# Patient Record
Sex: MALE | Race: WHITE | NOT HISPANIC OR LATINO
[De-identification: names, ages, dates, MRNs, and addresses within clinical notes are randomized per-mention and may not be internally consistent; named-entity substitution may affect disease eponyms.]

---

## 2019-07-16 ENCOUNTER — APPOINTMENT (OUTPATIENT)
Dept: PULMONOLOGY | Facility: CLINIC | Age: 54
End: 2019-07-16
Payer: COMMERCIAL

## 2019-07-16 VITALS
HEART RATE: 85 BPM | DIASTOLIC BLOOD PRESSURE: 84 MMHG | TEMPERATURE: 98.7 F | SYSTOLIC BLOOD PRESSURE: 120 MMHG | BODY MASS INDEX: 30.2 KG/M2 | HEIGHT: 72 IN | WEIGHT: 223 LBS | OXYGEN SATURATION: 99 %

## 2019-07-16 PROCEDURE — 99243 OFF/OP CNSLTJ NEW/EST LOW 30: CPT | Mod: 25

## 2019-07-16 PROCEDURE — 94060 EVALUATION OF WHEEZING: CPT

## 2019-07-16 PROCEDURE — 94727 GAS DIL/WSHOT DETER LNG VOL: CPT

## 2019-07-16 PROCEDURE — 94729 DIFFUSING CAPACITY: CPT

## 2019-07-16 RX ORDER — OMEPRAZOLE MAGNESIUM 20 MG/1
20 TABLET, DELAYED RELEASE ORAL DAILY
Qty: 30 | Refills: 1 | Status: ACTIVE | COMMUNITY
Start: 2019-07-16 | End: 1900-01-01

## 2019-08-13 ENCOUNTER — APPOINTMENT (OUTPATIENT)
Dept: PULMONOLOGY | Facility: CLINIC | Age: 54
End: 2019-08-13
Payer: COMMERCIAL

## 2019-08-13 VITALS
HEART RATE: 84 BPM | WEIGHT: 219 LBS | TEMPERATURE: 98.2 F | DIASTOLIC BLOOD PRESSURE: 78 MMHG | OXYGEN SATURATION: 95 % | SYSTOLIC BLOOD PRESSURE: 110 MMHG | RESPIRATION RATE: 16 BRPM | BODY MASS INDEX: 29.7 KG/M2

## 2019-08-13 PROCEDURE — 99214 OFFICE O/P EST MOD 30 MIN: CPT

## 2019-08-13 RX ORDER — FLUTICASONE FUROATE 100 UG/1
100 POWDER RESPIRATORY (INHALATION) DAILY
Qty: 1 | Refills: 4 | Status: DISCONTINUED | COMMUNITY
Start: 2019-07-16 | End: 2019-08-13

## 2019-08-13 NOTE — HISTORY OF PRESENT ILLNESS
[FreeTextEntry1] : 54 year old man who presented due to ongoing dyspnea. He reports that he he has had breathing problems since age 13. However, he was diagnosed with asthma 4 years ago.  He reports allergies to pollen, severe allergy to cats.  He has a dog that he states does no affect him.  He uses albuterol without much benefit.  He states benefit from Benadryl though it makes him very sleepy. \par \par At the time of the last visit, He was started on an inhaled .steroid.  However, he stays this has not relieved his symptoms.  He also emphasizes that he has lower chest, and epigastric chest pain.  He also emphasizes that he had significant scoliosis that he is a heavy snorer.  He states that he underwent in sleep study in the past that was negative.  However it appears that this study was limited.\par \par \par He reports significant GERD symptoms.  He does not take any medication for this.  He does have a drink of liquor every night.  \par \par He also had been given prednisone several months ago but states it did not really help.  He feels need to take deep breath and yawn. \par  \par \par He also smokes a few cigarettes every day.  \par \par PSH\par \par hair transplant\par \par \par PMH:\par \par Menieres disease\par \par MEDS\par \par triamterene\par \par Propecia\par \par \par \par SH:\par \par tobacco:  1 pack every 2 weeks

## 2019-08-13 NOTE — REVIEW OF SYSTEMS
[Postnasal Drip] : postnasal drip [Sinus Problems] : sinus problems [Cough] : cough [Dyspnea] : dyspnea [Chest Tightness] : chest tightness [Hay Fever] : hay fever [Fever] : no fever [Poor Appetite] : normal appetite  [Sputum] : not coughing up ~M sputum [Pleuritic Pain] : no pleuritic pain [Hemoptysis] : no hemoptysis [Hypertension] : no ~T hypertension

## 2019-08-13 NOTE — REVIEW OF SYSTEMS
[Fever] : no fever [Poor Appetite] : normal appetite  [Postnasal Drip] : postnasal drip [Sinus Problems] : sinus problems [Cough] : cough [Sputum] : not coughing up ~M sputum [Hemoptysis] : no hemoptysis [Dyspnea] : dyspnea [Chest Tightness] : chest tightness [Pleuritic Pain] : no pleuritic pain [Hypertension] : no ~T hypertension [Hay Fever] : hay fever

## 2019-08-13 NOTE — PHYSICAL EXAM
[Normal Oropharynx] : abnormal oropharynx [Low Lying Soft Palate] : no low lying soft palate [Elongated Uvula] : no elongated uvula [FreeTextEntry1] : no edema

## 2019-08-13 NOTE — PHYSICAL EXAM
[Normal Oropharynx] : abnormal oropharynx [Low Lying Soft Palate] : no low lying soft palate [Elongated Uvula] : no elongated uvula [FreeTextEntry1] : no edema [General Appearance - Well Developed] : well developed [Normal Appearance] : normal appearance [No Deformities] : no deformities [General Appearance - Well Nourished] : well nourished [Enlarged Base of the Tongue] : enlargement of the base of the tongue [Neck Appearance] : the appearance of the neck was normal [Neck Cervical Mass (___cm)] : no neck mass was observed [Jugular Venous Distention Increased] : there was no jugular-venous distention [Heart Rate And Rhythm] : heart rate and rhythm were normal [Heart Sounds] : normal S1 and S2 [Arterial Pulses Normal] : the arterial pulses were normal [Murmurs] : no murmurs present [Respiration, Rhythm And Depth] : normal respiratory rhythm and effort [Exaggerated Use Of Accessory Muscles For Inspiration] : no accessory muscle use [Bowel Sounds] : normal bowel sounds [Abdomen Soft] : soft [Abdomen Tenderness] : non-tender [] : no hepato-splenomegaly [Nail Clubbing] : no clubbing of the fingernails [Affect] : the affect was normal [Mood] : the mood was normal

## 2019-08-13 NOTE — HISTORY OF PRESENT ILLNESS
[FreeTextEntry1] : 54 year old man who presents due to ongoing dyspnea. he reports that he he has had breathing problems since age 13. However, he was diagnosed with asthma 4 years ago.  He reports allergies to pollen, severe allergy to cats.  He has a dog that he states does no affect him.  He uses albuterol without much benefit.  He states benefit from Benadryl though it makes him very sleepy. \par \par He was given prednisone several months ago but states it did not really help.  He feels need to take deep breath and yawn. \par He has cough,with occasional sputum. He has constant postnasal drip and snoring.  He ha ssed Flonase with some relief.  \par \par he reports significant GERD symptoms.  He does not take any medication for this.  He does have a drink of liquor every night.   \par \par He had sleep study reportedly negative though he feels he did not sleep.\par .He also smokes a few cigarettes every day.  \par \par PSH\par \par hair transplant\par \par \par PMH:\par \par Menieres disease\par \par MEDS\par \par triamterene\par \par propecia\par \par \par \par SH:\par \par tobacco:  1 pack every 2 weeks

## 2019-08-13 NOTE — DISCUSSION/SUMMARY
[FreeTextEntry1] : Dyspnea cough with comorbidity\par \par GERD\par "asthma"\par \par seasonal allergy\par \par postnasal drip\par \par tobacco use\par \par PLAN\par \par add ICS, Arnuity once daily\par \par Albuterol MDI as needed\par \par restart on PPI or Pepcid over the counter\par \par start on Flonase nasal spray at night, with saline nasal wash\par \par Allegra 180 mg\par \par allergen avoidance\par

## 2019-08-13 NOTE — DISCUSSION/SUMMARY
[FreeTextEntry1] : Shortness of breath, Cough, epigastric / chest discomfort;  He states he has undergone stress test that was negative recently.\par Scoliosis, Gastric reflux\par Shortness of breath not relieved with inhaled steroid\par \par PLAN\par stop Asmanex\par \par start Breo 100/25 once daily as trial\par \par rule out hiatus hernia, costochondritis\par \par Obtain sleep study\par \par [facility]

## 2019-09-25 ENCOUNTER — APPOINTMENT (OUTPATIENT)
Dept: PULMONOLOGY | Facility: CLINIC | Age: 54
End: 2019-09-25

## 2020-12-09 ENCOUNTER — APPOINTMENT (OUTPATIENT)
Dept: PULMONOLOGY | Facility: CLINIC | Age: 55
End: 2020-12-09
Payer: COMMERCIAL

## 2020-12-09 VITALS
DIASTOLIC BLOOD PRESSURE: 90 MMHG | TEMPERATURE: 98 F | SYSTOLIC BLOOD PRESSURE: 120 MMHG | OXYGEN SATURATION: 98 % | RESPIRATION RATE: 16 BRPM | HEART RATE: 99 BPM

## 2020-12-09 DIAGNOSIS — R06.83 SNORING: ICD-10-CM

## 2020-12-09 DIAGNOSIS — Z23 ENCOUNTER FOR IMMUNIZATION: ICD-10-CM

## 2020-12-09 PROCEDURE — 99214 OFFICE O/P EST MOD 30 MIN: CPT

## 2020-12-09 PROCEDURE — 99072 ADDL SUPL MATRL&STAF TM PHE: CPT

## 2020-12-09 RX ORDER — MOMETASONE FUROATE AND FORMOTEROL FUMARATE DIHYDRATE 100; 5 UG/1; UG/1
100-5 AEROSOL RESPIRATORY (INHALATION)
Qty: 1 | Refills: 2 | Status: DISCONTINUED | COMMUNITY
Start: 2019-08-13 | End: 2020-12-09

## 2020-12-10 PROBLEM — R06.83 SNORING: Status: ACTIVE | Noted: 2019-08-13

## 2020-12-10 PROBLEM — Z23 ENCOUNTER FOR IMMUNIZATION: Status: ACTIVE | Noted: 2020-12-10

## 2020-12-10 NOTE — DISCUSSION/SUMMARY
[FreeTextEntry1] : history pollen allergy\par dyspnea\par \par smoker\par \par obstructive arwys disease\par \par recent fall with T12 12 fracture\par \par PLAN\par \par trial of ICS/LABA\par \par albuterol  MDI as needed\par \par montelukast 10 mg \par \par declines influenza vaccine\par \par discuss NAYELI next visit\par \par Geovany Johnson MD [facility] \par \par

## 2020-12-10 NOTE — HISTORY OF PRESENT ILLNESS
[Obstructive Sleep Apnea] : obstructive sleep apnea [Snoring] : snoring [TextBox_4] : \par 55 year old man who presented due to ongoing dyspnea. He reports that he he has had breathing problems since age 13. However, he was diagnosed with asthma 6 years ago. He reports allergies to pollen, severe allergy to cats. He has a dog that he states does no affect him. He uses albuterol without much benefit. He states benefit from Benadryl though it makes him very sleepy. \par \par At the time of the last visit, He was started on an inhaled ICS/LABA but stopped using it.\par \par He also has significant scoliosis.\par \par He now reports that he was diagnose with NAYELI in the past but has not tolerated treatment with CPAP.\par \par He is a heavy snorer. \par \par He reports significant GERD symptoms. He does not take any medication for this. He does have a drink of liquor every night. \par \par \par He also smokes a few cigarettes every day. \par \par He had negative COVID test 2 weeks ago.\par \par \par He fell off ladder October 2020. Kyphoplasty considered for T 11-12\par \par He has had dyspnea, allergies worse.\par \par He also has GERD.  \par He is not using inhalers. \par \par He is not anything for allergies\par \par \par \par PSH\par \par hair transplant\par \par \par PMH:\par \par Menieres disease\par \par MEDS\par \par triamterene\par \par Propecia\par \par \par \par SH:\par \par tobacco: 1 pack every 2 weeks \par \par  \par  [TextBox_19] : tested 6 years ago approximately

## 2020-12-10 NOTE — REVIEW OF SYSTEMS
[Postnasal Drip] : postnasal drip [Dyspnea] : dyspnea [Hay Fever] : hay fever [Fever] : no fever [Nasal Congestion] : no nasal congestion [Cough] : no cough [Sputum] : no sputum

## 2020-12-10 NOTE — PHYSICAL EXAM
[No Acute Distress] : no acute distress [Normal Oropharynx] : normal oropharynx [1+] : Right Tonsil: 1+ [Normal Appearance] : normal appearance [No Neck Mass] : no neck mass [Normal Rate/Rhythm] : normal rate/rhythm [Normal S1, S2] : normal s1, s2 [No Murmurs] : no murmurs [No Resp Distress] : no resp distress [Benign] : benign [No Masses] : no masses [Normal Bowel Sounds] : normal bowel sounds [No Clubbing] : no clubbing [No Focal Deficits] : no focal deficits [No Motor Deficits] : no motor deficits [Oriented x3] : oriented x3 [Normal Affect] : normal affect [TextBox_68] : mildly diminished otherwise clear

## 2020-12-18 ENCOUNTER — TRANSCRIPTION ENCOUNTER (OUTPATIENT)
Age: 55
End: 2020-12-18

## 2020-12-18 ENCOUNTER — NON-APPOINTMENT (OUTPATIENT)
Age: 55
End: 2020-12-18

## 2020-12-23 ENCOUNTER — APPOINTMENT (OUTPATIENT)
Dept: PULMONOLOGY | Facility: CLINIC | Age: 55
End: 2020-12-23
Payer: COMMERCIAL

## 2020-12-23 ENCOUNTER — NON-APPOINTMENT (OUTPATIENT)
Age: 55
End: 2020-12-23

## 2020-12-23 VITALS
TEMPERATURE: 99 F | OXYGEN SATURATION: 96 % | HEART RATE: 95 BPM | DIASTOLIC BLOOD PRESSURE: 80 MMHG | SYSTOLIC BLOOD PRESSURE: 140 MMHG | RESPIRATION RATE: 17 BRPM

## 2020-12-23 PROCEDURE — 93000 ELECTROCARDIOGRAM COMPLETE: CPT

## 2020-12-23 PROCEDURE — 99072 ADDL SUPL MATRL&STAF TM PHE: CPT

## 2020-12-23 PROCEDURE — 99214 OFFICE O/P EST MOD 30 MIN: CPT | Mod: 25

## 2020-12-23 PROCEDURE — 94060 EVALUATION OF WHEEZING: CPT

## 2020-12-23 PROCEDURE — 94727 GAS DIL/WSHOT DETER LNG VOL: CPT

## 2020-12-23 PROCEDURE — 94729 DIFFUSING CAPACITY: CPT

## 2020-12-27 NOTE — PROCEDURE
[FreeTextEntry1] : PFT\par \par normal spirometry, lung volumes and diffusion\par \par \par  [facility]

## 2020-12-27 NOTE — PHYSICAL EXAM
[No Acute Distress] : no acute distress [Normal Oropharynx] : normal oropharynx [1+] : Right Tonsil: 1+ [Normal Appearance] : normal appearance [No Neck Mass] : no neck mass [Normal Rate/Rhythm] : normal rate/rhythm [Normal S1, S2] : normal s1, s2 [No Murmurs] : no murmurs [No Resp Distress] : no resp distress [Clear to Auscultation Bilaterally] : clear to auscultation bilaterally [Benign] : benign [No Masses] : no masses [Normal Bowel Sounds] : normal bowel sounds [No Clubbing] : no clubbing [No Focal Deficits] : no focal deficits [No Motor Deficits] : no motor deficits [Oriented x3] : oriented x3 [Normal Affect] : normal affect [TextBox_68] : no wheeze, rhonchi, rales, lungs clear full aeration

## 2020-12-27 NOTE — DISCUSSION/SUMMARY
[FreeTextEntry1] : history pollen allergy\par dyspnea\par hx asthma\par \par increased dyspnea, with clear lungs, no obstruction on PFT\par \par ddimer normal\par \par PLAN\par continue ICS/LABA, Breo\par \par albuterol MDI as needed\par \par montelukast 10 mg \par \par complete steroid course\par \par declines influenza vaccine\par \par discuss NAYELI next visit\par \par reevaluate next visit \par \par he will call me if symptoms persist\par \par he is also advised to go to ER if symptoms worsen\par \par Geovany Johnson MD [facility] \par

## 2020-12-27 NOTE — HISTORY OF PRESENT ILLNESS
[Obstructive Sleep Apnea] : obstructive sleep apnea [Snoring] : snoring [TextBox_4] : \par 55 year old man who presented due to ongoing dyspnea. He reports that he he has had breathing problems since age 13. However, he was diagnosed with asthma 6 years ago. He reports allergies to pollen, severe allergy to cats. He has a dog that he states does no affect him. He uses albuterol without much benefit. He states benefit from Benadryl though it makes him very sleepy. \par \par At the time of the last visit, He has been using inhaled ICS/LABA ( Breo)  and montelukast. \par \par He also has significant scoliosis.\par \par He has a history of NAYELI in the past but has not tolerated treatment with CPAP.\par \par He is a heavy snorer. \par \par He reports significant GERD symptoms. He does not take any medication for this. He does have a drink of liquor every night. \par \par \par He also smokes a few cigarettes every day. \par \par He had negative COVID test 2 weeks ago.\par \par \par He fell off ladder October 2020. Kyphoplasty considered for T 11-12\par \par He has had dyspnea, allergies worse.\par \par He also has GERD.  \par \par he returns because he has been having increased dyspnea.\par \par he was given oral prednisone.  he has only slightly improved.\par \par he had COVID testing recently\par \par \par PSH\par \par hair transplant\par \par \par PMH:\par \par Menieres disease\par \par MEDS\par \par triamterene\par \par Propecia\par \par \par \par SH:\par \par tobacco: 1 pack every 2 weeks \par \par \par \par \par \par \par  \par  [TextBox_19] : tested 6 years ago approximately

## 2021-01-03 LAB
BASOPHILS # BLD AUTO: 0.08 K/UL
BASOPHILS NFR BLD AUTO: 1.1 %
CRP SERPL-MCNC: <0.1 MG/DL
DEPRECATED D DIMER PPP IA-ACNC: <150 NG/ML DDU
EOSINOPHIL # BLD AUTO: 0.08 K/UL
EOSINOPHIL NFR BLD AUTO: 1.1 %
HCT VFR BLD CALC: 48.2 %
HGB BLD-MCNC: 16.8 G/DL
IMM GRANULOCYTES NFR BLD AUTO: 0.3 %
LYMPHOCYTES # BLD AUTO: 2.49 K/UL
LYMPHOCYTES NFR BLD AUTO: 34.1 %
MAN DIFF?: NORMAL
MCHC RBC-ENTMCNC: 30.5 PG
MCHC RBC-ENTMCNC: 34.9 GM/DL
MCV RBC AUTO: 87.5 FL
MONOCYTES # BLD AUTO: 0.53 K/UL
MONOCYTES NFR BLD AUTO: 7.3 %
NEUTROPHILS # BLD AUTO: 4.11 K/UL
NEUTROPHILS NFR BLD AUTO: 56.1 %
NT-PROBNP SERPL-MCNC: <5 PG/ML
PLATELET # BLD AUTO: 257 K/UL
RBC # BLD: 5.51 M/UL
RBC # FLD: 12 %
SARS-COV-2 N GENE NPH QL NAA+PROBE: NOT DETECTED
WBC # FLD AUTO: 7.31 K/UL

## 2021-04-15 ENCOUNTER — RX RENEWAL (OUTPATIENT)
Age: 56
End: 2021-04-15

## 2021-07-06 ENCOUNTER — RX RENEWAL (OUTPATIENT)
Age: 56
End: 2021-07-06

## 2021-07-07 ENCOUNTER — RX RENEWAL (OUTPATIENT)
Age: 56
End: 2021-07-07

## 2021-11-24 ENCOUNTER — APPOINTMENT (OUTPATIENT)
Dept: PULMONOLOGY | Facility: CLINIC | Age: 56
End: 2021-11-24
Payer: COMMERCIAL

## 2021-11-24 VITALS
BODY MASS INDEX: 29.16 KG/M2 | OXYGEN SATURATION: 99 % | HEART RATE: 97 BPM | WEIGHT: 215 LBS | DIASTOLIC BLOOD PRESSURE: 92 MMHG | TEMPERATURE: 97.3 F | SYSTOLIC BLOOD PRESSURE: 134 MMHG

## 2021-11-24 PROCEDURE — 99214 OFFICE O/P EST MOD 30 MIN: CPT

## 2021-11-24 RX ORDER — METHYLPREDNISOLONE 4 MG/1
4 TABLET ORAL
Qty: 1 | Refills: 0 | Status: DISCONTINUED | COMMUNITY
Start: 2020-12-18 | End: 2021-11-24

## 2021-11-24 RX ORDER — FLUTICASONE PROPIONATE 50 UG/1
50 SPRAY, METERED NASAL
Qty: 1 | Refills: 1 | Status: DISCONTINUED | COMMUNITY
Start: 2019-07-16 | End: 2021-11-24

## 2021-11-27 NOTE — DISCUSSION/SUMMARY
[FreeTextEntry1] : history pollen allergy, not using medication\par dyspnea\par Asthma \par \par \par PLAN\par Restart ICS/LABA, Breo\par \par albuterol MDI as needed\par \par montelukast 10 mg \par \par \par declines influenza vaccine\par \par COVID vaccine \par \par discussed NAYELI , he declines further workup\par \par Total time spent : 30 minutes\par Including:\par Preparation prior to visit - Reviewing prior record, results of tests and Consultation Reports as applicable\par Conducting an appropriate H & P during today's encounter\par Appropriate orders for tests, medications and procedures, as applicable\par Counseling patient \par Note completion \par \par Geovany Johnson MD \par \par

## 2021-11-27 NOTE — HISTORY OF PRESENT ILLNESS
[Obstructive Sleep Apnea] : obstructive sleep apnea [Snoring] : snoring [TextBox_4] : \par 56 year old man who presented due to ongoing dyspnea. He reports that he he has had breathing problems since age 13. However, he was diagnosed with asthma 6 years ago. He reports allergies to pollen, severe allergy to cats. He has a dog that he states does no affect him. \par \par He states benefit from Benadryl though it makes him very sleepy. \par \par He also has significant scoliosis.\par \par He has a history of NAYELI in the past but has not tolerated treatment with CPAP.  He reports poor sleep.  \par \par He is a heavy snorer. \par \par He reports significant GERD symptoms. He does not take any medication for this. He does have a drink of liquor every night. \par \par \par He fell off ladder October 2020. Kyphoplasty considered for T 11-12\par \par \par He also has GERD.  \par \par .....\par \par He stopped using inhaled bronchodilator several months ago.  He now has increased symptoms.  He has cough and wheeze, worse at night.\par \par He is no longer smoking\par \par PSH\par \par hair transplant\par \par \par PMH:\par \par Menieres disease\par \par MEDS\par \par triamterene\par \par Propecia\par \par SH:\par \par tobacco: 1 pack every 2 weeks \par \par \par \par \par \par \par  \par  [TextBox_19] : tested 6 years ago approximately

## 2022-06-13 ENCOUNTER — RX RENEWAL (OUTPATIENT)
Age: 57
End: 2022-06-13

## 2022-10-09 ENCOUNTER — RX RENEWAL (OUTPATIENT)
Age: 57
End: 2022-10-09

## 2022-10-09 RX ORDER — MONTELUKAST 10 MG/1
10 TABLET, FILM COATED ORAL
Qty: 90 | Refills: 0 | Status: DISCONTINUED | COMMUNITY
Start: 2020-12-09 | End: 2022-10-09

## 2024-02-20 ENCOUNTER — APPOINTMENT (OUTPATIENT)
Dept: PULMONOLOGY | Facility: CLINIC | Age: 59
End: 2024-02-20
Payer: COMMERCIAL

## 2024-02-20 VITALS
HEART RATE: 89 BPM | DIASTOLIC BLOOD PRESSURE: 84 MMHG | BODY MASS INDEX: 27.8 KG/M2 | OXYGEN SATURATION: 94 % | TEMPERATURE: 98.2 F | SYSTOLIC BLOOD PRESSURE: 126 MMHG | WEIGHT: 205 LBS

## 2024-02-20 DIAGNOSIS — M40.209 UNSPECIFIED KYPHOSIS, SITE UNSPECIFIED: ICD-10-CM

## 2024-02-20 PROCEDURE — 99214 OFFICE O/P EST MOD 30 MIN: CPT | Mod: 25

## 2024-02-20 PROCEDURE — 94729 DIFFUSING CAPACITY: CPT

## 2024-02-20 PROCEDURE — 94727 GAS DIL/WSHOT DETER LNG VOL: CPT

## 2024-02-20 PROCEDURE — 94060 EVALUATION OF WHEEZING: CPT

## 2024-02-20 NOTE — HISTORY OF PRESENT ILLNESS
[Obstructive Sleep Apnea] : obstructive sleep apnea [Snoring] : snoring [Former] : former [< 20 pack-years] : < 20 pack-years [Never] : never [TextBox_4] : 58 year old man who presented due to ongoing dyspnea. He reports that he has had breathing problems since age 13. However, he was diagnosed with asthma 6 years ago. He reports allergies to pollen, severe allergy to cats. He has a dog that he states does no affect him.   He states benefit from Benadryl though it makes him very sleepy.   He also has significant scoliosis.  He has a history of NAYELI in the past but has not tolerated treatment with CPAP.  He reports poor sleep.    He is a heavy snorer.   He reports significant GERD symptoms. He does not take any medication for this. He does have a drink of liquor every night.    He fell off ladder October 2020. Kyphoplasty considered for T 11-12   He also has GERD.    .....  He stopped using inhaled bronchodilator several months ago.  He now has increased symptoms.  He has cough and wheeze, worse at night.  He is no longer smoking  PSH  hair transplant   PMH:  Menieres disease  MEDS  triamterene  Propecia  SH:  tobacco: 1 pack every 2 weeks           [TextBox_19] : tested 6 years ago approximately

## 2024-02-20 NOTE — PROCEDURE
[FreeTextEntry1] : PFT  normal spirometry, lung volumes and diffusion    [facility]

## 2024-02-20 NOTE — DISCUSSION/SUMMARY
[FreeTextEntry1] : history pollen allergy, not using medication dyspnea Asthma   He has not been using  ICS/LABA,albuterol MDI  or montelukast 10 mg for 1 year  He has had increased symptoms he states for 1 week  Also has lump on chest, possible cyst . Has appointment with dermatology  PFT is without obstructive airway disease normal   he has cough and dyspnea  Not smoking  declines influenza vaccine  PLAN  restart ICS LABA, albuterol and montelukast 10 mg daily  Flonase and saline nasal spray  CXR  discussed NAYELI , he declines further workup  follow up  Total time spent : 30 minutes Including: Preparation prior to visit - Reviewing prior record, results of tests and Consultation Reports as applicable Conducting an appropriate H & P during today's encounter Appropriate orders for tests, medications and procedures, as applicable Counseling patient  Note completion   Geovany Johnson MD

## 2024-03-12 ENCOUNTER — APPOINTMENT (OUTPATIENT)
Dept: PULMONOLOGY | Facility: CLINIC | Age: 59
End: 2024-03-12
Payer: COMMERCIAL

## 2024-03-12 VITALS
HEART RATE: 92 BPM | WEIGHT: 200 LBS | BODY MASS INDEX: 27.13 KG/M2 | OXYGEN SATURATION: 97 % | DIASTOLIC BLOOD PRESSURE: 80 MMHG | SYSTOLIC BLOOD PRESSURE: 118 MMHG | TEMPERATURE: 98.2 F

## 2024-03-12 DIAGNOSIS — K21.9 GASTRO-ESOPHAGEAL REFLUX DISEASE W/OUT ESOPHAGITIS: ICD-10-CM

## 2024-03-12 DIAGNOSIS — R06.00 DYSPNEA, UNSPECIFIED: ICD-10-CM

## 2024-03-12 DIAGNOSIS — J45.909 UNSPECIFIED ASTHMA, UNCOMPLICATED: ICD-10-CM

## 2024-03-12 DIAGNOSIS — G47.33 OBSTRUCTIVE SLEEP APNEA (ADULT) (PEDIATRIC): ICD-10-CM

## 2024-03-12 PROCEDURE — 99214 OFFICE O/P EST MOD 30 MIN: CPT

## 2024-03-12 RX ORDER — FLUTICASONE FUROATE AND VILANTEROL TRIFENATATE 100; 25 UG/1; UG/1
100-25 POWDER RESPIRATORY (INHALATION)
Qty: 1 | Refills: 3 | Status: ACTIVE | COMMUNITY
Start: 2020-12-09 | End: 1900-01-01

## 2024-03-12 RX ORDER — MONTELUKAST 10 MG/1
10 TABLET, FILM COATED ORAL
Qty: 1 | Refills: 1 | Status: ACTIVE | COMMUNITY
Start: 2024-02-20 | End: 1900-01-01

## 2024-03-12 RX ORDER — ALBUTEROL SULFATE 90 UG/1
108 (90 BASE) INHALANT RESPIRATORY (INHALATION)
Qty: 1 | Refills: 3 | Status: ACTIVE | COMMUNITY
Start: 2020-12-09 | End: 1900-01-01

## 2024-03-12 NOTE — PHYSICAL EXAM
[No Acute Distress] : no acute distress [Normal Oropharynx] : normal oropharynx [1+] : Left Tonsil: 1+ [Normal Appearance] : normal appearance [No Neck Mass] : no neck mass [Normal Rate/Rhythm] : normal rate/rhythm [Normal S1, S2] : normal s1, s2 [No Murmurs] : no murmurs [Clear to Auscultation Bilaterally] : clear to auscultation bilaterally [No Resp Distress] : no resp distress [Benign] : benign [No Masses] : no masses [Normal Bowel Sounds] : normal bowel sounds [No Clubbing] : no clubbing [No Focal Deficits] : no focal deficits [No Motor Deficits] : no motor deficits [Oriented x3] : oriented x3 [Normal Affect] : normal affect [TextBox_68] : no wheeze, rhonchi, rales, lungs clear full aeration

## 2024-03-12 NOTE — PROCEDURE
[FreeTextEntry1] : PFT  normal spirometry, lung volumes and diffusion    [facility]

## 2024-03-12 NOTE — HISTORY OF PRESENT ILLNESS
[Former] : former [< 20 pack-years] : < 20 pack-years [Never] : never [Obstructive Sleep Apnea] : obstructive sleep apnea [Snoring] : snoring [TextBox_19] : tested 6 years ago approximately [TextBox_4] : 58 year old man who presented due to ongoing dyspnea. He reports that he has had breathing problems since age 13. However, he was diagnosed with asthma 6 years ago. He reports allergies to pollen, severe allergy to cats. He has a dog that he states does no affect him.   He states benefit from Benadryl though it makes him very sleepy.   He also has significant scoliosis.  He has a history of NAYELI in the past but has not tolerated treatment with CPAP.  He reports poor sleep.    He is a heavy snorer.   He reports significant GERD symptoms. He does not take any medication for this. He does have a drink of liquor every night.    He fell off ladder October 2020. Kyphoplasty considered for T 11-12   He also has GERD.    .....  He stopped using inhaled bronchodilator several months ago.  He now has increased symptoms.  He has cough and wheeze, worse at night.  He is no longer smoking  PSH  hair transplant   PMH:  Menieres disease  MEDS  triamterene  Propecia  SH:  tobacco: 1 pack every 2 weeks

## 2024-03-12 NOTE — DISCUSSION/SUMMARY
[FreeTextEntry1] : history pollen allergy, not using medication dyspnea Asthma   He has not been using  ICS/LABA,albuterol MDI  or montelukast 10 mg for 1 year  He has had increased symptoms he states for 1 week  Also has lump on chest, possible cyst . Has appointment with dermatology  PFT is without obstructive airway disease normal   he has cough and dyspnea  Not smoking  declines influenza vaccine  Has been taking ICS LABA, albuterol and montelukast 10 mg daily He is unclear on how to use them and not clear that he  has been using regularly  he has dyspnea at night.  Some dyspnea in day  He is having construction done at home  Flonase and saline nasal spray  used in morning  discussed NAYELI , he declines further workup  He has no wheeze on exam  PLAN Continue ICS LABA with albuterol, encourage use  Symptoms may relate to kyphosis  I have also recommended home sleep study  Will order at home  follow up  Total time spent : 30 minutes Including: Preparation prior to visit - Reviewing prior record, results of tests and Consultation Reports as applicable Conducting an appropriate H & P during today's encounter Appropriate orders for tests, medications and procedures, as applicable Counseling patient  Note completion   Geovany Johnson MD

## 2024-04-15 ENCOUNTER — LABORATORY RESULT (OUTPATIENT)
Age: 59
End: 2024-04-15

## 2024-04-16 LAB
BASOPHILS # BLD AUTO: 0.06 K/UL
BASOPHILS NFR BLD AUTO: 1.2 %
EOSINOPHIL # BLD AUTO: 0.06 K/UL
EOSINOPHIL # BLD MANUAL: 60 /UL
EOSINOPHIL NFR BLD AUTO: 1.2 %
HCT VFR BLD CALC: 41.6 %
HGB BLD-MCNC: 14.6 G/DL
LYMPHOCYTES # BLD AUTO: 1.47 K/UL
LYMPHOCYTES NFR BLD AUTO: 28.7 %
MCHC RBC-ENTMCNC: 30.6 PG
MCHC RBC-ENTMCNC: 35.1 GM/DL
MCV RBC AUTO: 87.2 FL
MONOCYTES # BLD AUTO: 0.47 K/UL
MONOCYTES NFR BLD AUTO: 9.2 %
MSMEAR: NORMAL
MYELOCYTES NFR BLD: 2.3 %
NEUTROPHILS # BLD AUTO: 2.76 K/UL
NEUTROPHILS NFR BLD AUTO: 52.9 %
NEUTS BAND NFR BLD MANUAL: 1.1 %
PLAT MORPH BLD: NORMAL
PLATELET # BLD AUTO: 242 K/UL
RBC # BLD: 4.77 M/UL
RBC # FLD: 12.5 %
RBC BLD AUTO: NORMAL
VARIANT LYMPHS # BLD MANUAL: 3.4 %
WBC # FLD AUTO: 5.11 K/UL

## 2024-04-17 LAB
A ALTERNATA IGE QN: <0.1 KUA/L
A FUMIGATUS IGE QN: <0.1 KUA/L
C ALBICANS IGE QN: <0.1 KUA/L
C HERBARUM IGE QN: <0.1 KUA/L
CAT DANDER IGE QN: <0.1 KUA/L
COMMON RAGWEED IGE QN: <0.1 KUA/L
D FARINAE IGE QN: 0.16 KUA/L
D PTERONYSS IGE QN: 0.15 KUA/L
DEPRECATED A ALTERNATA IGE RAST QL: 0 (ref 0–?)
DEPRECATED A FUMIGATUS IGE RAST QL: 0 (ref 0–?)
DEPRECATED C ALBICANS IGE RAST QL: 0
DEPRECATED C HERBARUM IGE RAST QL: 0 (ref 0–?)
DEPRECATED CAT DANDER IGE RAST QL: 0 (ref 0–?)
DEPRECATED COMMON RAGWEED IGE RAST QL: 0 (ref 0–?)
DEPRECATED D FARINAE IGE RAST QL: NORMAL (ref 0–?)
DEPRECATED D PTERONYSS IGE RAST QL: NORMAL (ref 0–?)
DEPRECATED DOG DANDER IGE RAST QL: 0 (ref 0–?)
DEPRECATED M RACEMOSUS IGE RAST QL: 0
DEPRECATED ROACH IGE RAST QL: 0 (ref 0–?)
DEPRECATED TIMOTHY IGE RAST QL: NORMAL (ref 0–?)
DEPRECATED WHITE OAK IGE RAST QL: 0 (ref 0–?)
DOG DANDER IGE QN: <0.1 KUA/L
M RACEMOSUS IGE QN: <0.1 KUA/L
ROACH IGE QN: <0.1 KUA/L
TIMOTHY IGE QN: 0.23 KUA/L
TOTAL IGE SMQN RAST: 4 KU/L
WHITE OAK IGE QN: <0.1 KUA/L

## 2024-04-18 LAB
ALTERN TENCAPG(M6): 3.1 MCG/ML
ASPER FUMCAPG(M3): 21.2 MCG/ML
AUREOBASCAPG(M12): 4.9 MCG/ML
LACEYELLA SACCHARI IGG: 7.1 MCG/ML
MICROPOLYCAPG(M22): <2 MCG/ML
PENIC CHRYCAPG(M1): 17 MCG/ML
PHOMA BETAE IGG: 3 MCG/ML
TRICHODERMA VIRIDE IGG: 3.4 MCG/ML

## 2024-09-17 ENCOUNTER — APPOINTMENT (OUTPATIENT)
Dept: PULMONOLOGY | Facility: CLINIC | Age: 59
End: 2024-09-17
Payer: COMMERCIAL

## 2024-09-17 VITALS
TEMPERATURE: 98.7 F | DIASTOLIC BLOOD PRESSURE: 80 MMHG | BODY MASS INDEX: 27.67 KG/M2 | WEIGHT: 204 LBS | OXYGEN SATURATION: 98 % | HEART RATE: 85 BPM | SYSTOLIC BLOOD PRESSURE: 130 MMHG

## 2024-09-17 DIAGNOSIS — J45.909 UNSPECIFIED ASTHMA, UNCOMPLICATED: ICD-10-CM

## 2024-09-17 DIAGNOSIS — G47.33 OBSTRUCTIVE SLEEP APNEA (ADULT) (PEDIATRIC): ICD-10-CM

## 2024-09-17 DIAGNOSIS — M40.209 UNSPECIFIED KYPHOSIS, SITE UNSPECIFIED: ICD-10-CM

## 2024-09-17 PROCEDURE — 99214 OFFICE O/P EST MOD 30 MIN: CPT

## 2024-09-17 RX ORDER — ALBUTEROL SULFATE AND BUDESONIDE 90; 80 UG/1; UG/1
90-80 AEROSOL, METERED RESPIRATORY (INHALATION) EVERY 6 HOURS
Qty: 1 | Refills: 3 | Status: ACTIVE | COMMUNITY
Start: 2024-09-17 | End: 1900-01-01

## 2024-09-17 NOTE — HISTORY OF PRESENT ILLNESS
[Former] : former [< 20 pack-years] : < 20 pack-years [Never] : never [Obstructive Sleep Apnea] : obstructive sleep apnea [Snoring] : snoring [TextBox_4] : 58 year old man who presented due to ongoing dyspnea. He reports that he has had breathing problems since age 13. However, he was diagnosed with asthma 6 years ago. He reports allergies to pollen, severe allergy to cats. He has a dog that he states does no affect him.   He states benefit from Benadryl though it makes him very sleepy.   He also has significant scoliosis.  He has a history of NAYELI in the past but has not tolerated treatment with CPAP.  He reports poor sleep.    He is a heavy snorer.   He reports significant GERD symptoms. He does not take any medication for this. He does have a drink of liquor every night.    He fell off ladder October 2020. Kyphoplasty considered for T 11-12   He also has GERD.    .....  He stopped using inhaled bronchodilator several months ago.  He now has increased symptoms.  He has cough and wheeze, worse at night.  He is no longer smoking  PSH  hair transplant   PMH:  Menieres disease  MEDS  triamterene  Propecia  SH:  tobacco: 1 pack every 2 weeks           [TextBox_19] : tested 6 years ago approximately

## 2024-09-17 NOTE — DISCUSSION/SUMMARY
[FreeTextEntry1] : history pollen allergy, not using medication  Asthma   He had not been using  ICS/LABA or montelukast 10 mg for 1 year  Also has lump on chest, possible cyst . saw  dermatology  PFT did not demonstrate obstructive airway disease.  No restriction  he has cough and dyspnea  Not smoking  Now taking ICS LABA, albuterol and montelukast 10 mg daily   he reports chronic dyspnea but he  developed increased chest heaviness yesterday He states he has tightness over upper abdomen  He has no wheeze, no prolonged exhalation on exam  Dyspnea may be due to back problems and kyphosis   He did undergo HST that demonstrated moderate NAYELI TONY 18  he reports poor sleep  PLAN Continue ICS LABA with albuterol, encourage use  Doubt need to advance inhaled bronchodilator regimen as PFT is without obstructive airway disease, but will  try Air Supra 2 puffs as needed as trial  Encourage deep breathing  Will order AutoCPAP  5-18 with nasal pillows   Total time spent : 30 minutes Including: Preparation prior to visit - Reviewing prior record, results of tests and Consultation Reports as applicable Conducting an appropriate H & P during today's encounter Appropriate orders for tests, medications and procedures, as applicable Counseling patient  Note completion   Geovany Johnson MD

## 2024-09-29 ENCOUNTER — NON-APPOINTMENT (OUTPATIENT)
Age: 59
End: 2024-09-29

## 2024-09-30 ENCOUNTER — APPOINTMENT (OUTPATIENT)
Dept: PULMONOLOGY | Facility: CLINIC | Age: 59
End: 2024-09-30
Payer: COMMERCIAL

## 2024-09-30 VITALS
DIASTOLIC BLOOD PRESSURE: 72 MMHG | WEIGHT: 202 LBS | SYSTOLIC BLOOD PRESSURE: 110 MMHG | OXYGEN SATURATION: 97 % | TEMPERATURE: 98.1 F | HEART RATE: 98 BPM | BODY MASS INDEX: 27.4 KG/M2

## 2024-09-30 DIAGNOSIS — G47.33 OBSTRUCTIVE SLEEP APNEA (ADULT) (PEDIATRIC): ICD-10-CM

## 2024-09-30 DIAGNOSIS — J45.909 UNSPECIFIED ASTHMA, UNCOMPLICATED: ICD-10-CM

## 2024-09-30 DIAGNOSIS — K21.9 GASTRO-ESOPHAGEAL REFLUX DISEASE W/OUT ESOPHAGITIS: ICD-10-CM

## 2024-09-30 DIAGNOSIS — R06.00 DYSPNEA, UNSPECIFIED: ICD-10-CM

## 2024-09-30 PROCEDURE — 99214 OFFICE O/P EST MOD 30 MIN: CPT

## 2024-09-30 NOTE — DISCUSSION/SUMMARY
[FreeTextEntry1] : history pollen allergy, not using medication  Asthma   He had not been using  ICS/LABA or montelukast 10 mg for 1 year  Also has lump on chest, possible cyst . saw  dermatology  PFT did not demonstrate obstructive airway disease.  No restriction  he has cough and dyspnea  Not smoking  Now taking ICS LABA, albuterol and montelukast 10 mg daily   he reports chronic dyspnea but he  developed increased chest heaviness yesterday He states he has tightness over upper abdomen  He has no wheeze, no prolonged exhalation on exam  Dyspnea may be due to back problems and kyphosis   He did undergo HST that demonstrated moderate NAYELI TONY 18  he reports poor sleep   He is using ICS LABA and Airsupra  He is somewhat improved  PLAN Continue ICS LABA with albuterol, encourage use  Doubt need to advance inhaled bronchodilator regimen as PFT is without obstructive airway disease,   Continue  Air Supra 2 puffs as needed as trial  Encourage deep breathing  I have ordered AutoCPAP  5-18 with nasal pillows  He states he does not want it  He also declines oral mandibular advancement device He will elevate bed, sleep on side  Total time spent : 30 minutes Including: Preparation prior to visit - Reviewing prior record, results of tests and Consultation Reports as applicable Conducting an appropriate H & P during today's encounter Appropriate orders for tests, medications and procedures, as applicable Counseling patient  Note completion   Geovany Johnson MD

## 2025-04-01 ENCOUNTER — APPOINTMENT (OUTPATIENT)
Dept: PULMONOLOGY | Facility: CLINIC | Age: 60
End: 2025-04-01
Payer: COMMERCIAL

## 2025-04-01 VITALS
HEIGHT: 72 IN | OXYGEN SATURATION: 97 % | TEMPERATURE: 95.5 F | BODY MASS INDEX: 27.77 KG/M2 | HEART RATE: 83 BPM | DIASTOLIC BLOOD PRESSURE: 72 MMHG | SYSTOLIC BLOOD PRESSURE: 120 MMHG | WEIGHT: 205 LBS

## 2025-04-01 DIAGNOSIS — J45.909 UNSPECIFIED ASTHMA, UNCOMPLICATED: ICD-10-CM

## 2025-04-01 DIAGNOSIS — G47.33 OBSTRUCTIVE SLEEP APNEA (ADULT) (PEDIATRIC): ICD-10-CM

## 2025-04-01 DIAGNOSIS — R06.00 DYSPNEA, UNSPECIFIED: ICD-10-CM

## 2025-04-01 PROCEDURE — 99214 OFFICE O/P EST MOD 30 MIN: CPT

## 2025-05-13 ENCOUNTER — TRANSCRIPTION ENCOUNTER (OUTPATIENT)
Age: 60
End: 2025-05-13

## 2025-05-13 ENCOUNTER — OUTPATIENT (OUTPATIENT)
Dept: OUTPATIENT SERVICES | Facility: HOSPITAL | Age: 60
LOS: 1 days | End: 2025-05-13
Payer: COMMERCIAL

## 2025-05-13 ENCOUNTER — APPOINTMENT (OUTPATIENT)
Dept: CT IMAGING | Facility: IMAGING CENTER | Age: 60
End: 2025-05-13
Payer: COMMERCIAL

## 2025-05-13 DIAGNOSIS — Z00.8 ENCOUNTER FOR OTHER GENERAL EXAMINATION: ICD-10-CM

## 2025-05-13 PROCEDURE — 75574 CT ANGIO HRT W/3D IMAGE: CPT

## 2025-05-13 PROCEDURE — 75574 CT ANGIO HRT W/3D IMAGE: CPT | Mod: 26

## 2025-08-14 ENCOUNTER — RX RENEWAL (OUTPATIENT)
Age: 60
End: 2025-08-14